# Patient Record
Sex: FEMALE | Race: WHITE | Employment: OTHER | URBAN - METROPOLITAN AREA
[De-identification: names, ages, dates, MRNs, and addresses within clinical notes are randomized per-mention and may not be internally consistent; named-entity substitution may affect disease eponyms.]

---

## 2017-02-14 ENCOUNTER — HOSPITAL ENCOUNTER (OUTPATIENT)
Dept: WOUND CARE | Age: 67
Discharge: HOME | End: 2017-02-14
Attending: PODIATRIST | Admitting: PODIATRIST

## 2017-02-14 VITALS
SYSTOLIC BLOOD PRESSURE: 157 MMHG | TEMPERATURE: 97.7 F | DIASTOLIC BLOOD PRESSURE: 81 MMHG | HEART RATE: 70 BPM | RESPIRATION RATE: 18 BRPM

## 2017-02-14 DIAGNOSIS — I83.009 VENOUS ULCER (HCC): Primary | ICD-10-CM

## 2017-02-14 DIAGNOSIS — L97.909 VENOUS ULCER (HCC): Primary | ICD-10-CM

## 2017-02-14 DIAGNOSIS — I73.9 PVD (PERIPHERAL VASCULAR DISEASE) (HCC): ICD-10-CM

## 2017-02-14 ASSESSMENT — PAIN SCALES - GENERAL: PAINLEVEL_OUTOF10: 5

## 2017-02-14 ASSESSMENT — PAIN DESCRIPTION - DESCRIPTORS: DESCRIPTORS: SHARP

## 2017-02-14 ASSESSMENT — PAIN DESCRIPTION - PAIN TYPE: TYPE: CHRONIC PAIN

## 2017-02-14 ASSESSMENT — PAIN DESCRIPTION - ONSET: ONSET: AWAKENED FROM SLEEP

## 2017-02-14 ASSESSMENT — PAIN DESCRIPTION - LOCATION: LOCATION: LEG

## 2017-02-14 ASSESSMENT — PAIN DESCRIPTION - ORIENTATION: ORIENTATION: RIGHT;LEFT

## 2017-02-14 ASSESSMENT — PAIN DESCRIPTION - FREQUENCY: FREQUENCY: CONTINUOUS

## 2017-02-15 ENCOUNTER — HOSPITAL ENCOUNTER (OUTPATIENT)
Dept: WOUND CARE | Age: 67
Discharge: HOME | End: 2017-02-15
Attending: PODIATRIST | Admitting: PODIATRIST

## 2017-02-15 VITALS — HEART RATE: 81 BPM | SYSTOLIC BLOOD PRESSURE: 180 MMHG | DIASTOLIC BLOOD PRESSURE: 94 MMHG | TEMPERATURE: 97 F

## 2017-02-21 ENCOUNTER — HOSPITAL ENCOUNTER (OUTPATIENT)
Dept: WOUND CARE | Age: 67
Discharge: HOME | End: 2017-02-21
Attending: PODIATRIST | Admitting: PODIATRIST

## 2017-02-21 VITALS — TEMPERATURE: 97 F | SYSTOLIC BLOOD PRESSURE: 186 MMHG | DIASTOLIC BLOOD PRESSURE: 61 MMHG | RESPIRATION RATE: 18 BRPM

## 2017-02-21 DIAGNOSIS — L97.909 VENOUS ULCER (HCC): Primary | ICD-10-CM

## 2017-02-21 DIAGNOSIS — I73.9 PVD (PERIPHERAL VASCULAR DISEASE) (HCC): ICD-10-CM

## 2017-02-21 DIAGNOSIS — I83.009 VENOUS ULCER (HCC): Primary | ICD-10-CM

## 2017-02-24 ENCOUNTER — HOSPITAL ENCOUNTER (OUTPATIENT)
Dept: WOUND CARE | Age: 67
Discharge: HOME | End: 2017-02-24
Attending: PODIATRIST | Admitting: PODIATRIST

## 2017-02-24 VITALS
TEMPERATURE: 97.5 F | DIASTOLIC BLOOD PRESSURE: 80 MMHG | SYSTOLIC BLOOD PRESSURE: 147 MMHG | HEART RATE: 62 BPM | RESPIRATION RATE: 16 BRPM

## 2017-02-28 ENCOUNTER — HOSPITAL ENCOUNTER (OUTPATIENT)
Dept: WOUND CARE | Age: 67
Discharge: HOME | End: 2017-02-28
Attending: PODIATRIST | Admitting: PODIATRIST

## 2017-02-28 VITALS
HEART RATE: 74 BPM | RESPIRATION RATE: 18 BRPM | DIASTOLIC BLOOD PRESSURE: 88 MMHG | TEMPERATURE: 97 F | SYSTOLIC BLOOD PRESSURE: 152 MMHG

## 2017-02-28 DIAGNOSIS — E08.43 DIABETES MELLITUS DUE TO UNDERLYING CONDITION WITH DIABETIC AUTONOMIC NEUROPATHY, WITHOUT LONG-TERM CURRENT USE OF INSULIN (HCC): ICD-10-CM

## 2017-02-28 DIAGNOSIS — I73.9 PVD (PERIPHERAL VASCULAR DISEASE) (HCC): ICD-10-CM

## 2017-02-28 DIAGNOSIS — I83.009 VENOUS ULCER (HCC): Primary | ICD-10-CM

## 2017-02-28 DIAGNOSIS — L97.909 VENOUS ULCER (HCC): Primary | ICD-10-CM

## 2017-06-13 ENCOUNTER — HOSPITAL ENCOUNTER (OUTPATIENT)
Dept: WOUND CARE | Age: 67
Discharge: HOME | End: 2017-06-13
Attending: PODIATRIST | Admitting: PODIATRIST

## 2017-06-13 VITALS
DIASTOLIC BLOOD PRESSURE: 80 MMHG | RESPIRATION RATE: 18 BRPM | WEIGHT: 202 LBS | HEIGHT: 69 IN | TEMPERATURE: 97.2 F | BODY MASS INDEX: 29.92 KG/M2 | SYSTOLIC BLOOD PRESSURE: 132 MMHG | HEART RATE: 74 BPM

## 2017-06-13 DIAGNOSIS — L97.909 VENOUS ULCER (HCC): Primary | ICD-10-CM

## 2017-06-13 DIAGNOSIS — L97.222 NON-PRESSURE CHRONIC ULCER OF LEFT CALF WITH FAT LAYER EXPOSED (HCC): ICD-10-CM

## 2017-06-13 DIAGNOSIS — L97.212 NON-PRESSURE CHRONIC ULCER OF RIGHT CALF WITH FAT LAYER EXPOSED (HCC): ICD-10-CM

## 2017-06-13 DIAGNOSIS — I83.009 VENOUS ULCER (HCC): Primary | ICD-10-CM

## 2017-06-13 DIAGNOSIS — I73.9 PVD (PERIPHERAL VASCULAR DISEASE) (HCC): ICD-10-CM

## 2017-06-13 RX ORDER — LORATADINE 10 MG/1
10 TABLET ORAL DAILY
COMMUNITY

## 2017-06-21 PROBLEM — I95.9 HYPOTENSION: Status: ACTIVE | Noted: 2017-06-21

## 2017-06-21 PROBLEM — I87.2 VENOUS STASIS DERMATITIS OF BOTH LOWER EXTREMITIES: Status: ACTIVE | Noted: 2017-06-21

## 2017-06-21 PROBLEM — E86.0 DEHYDRATION: Status: ACTIVE | Noted: 2017-06-21

## 2017-06-23 ENCOUNTER — HOSPITAL ENCOUNTER (OUTPATIENT)
Dept: WOUND CARE | Age: 67
Discharge: HOME | End: 2017-06-23
Attending: INTERNAL MEDICINE | Admitting: INTERNAL MEDICINE

## 2017-06-23 VITALS
RESPIRATION RATE: 16 BRPM | TEMPERATURE: 95.7 F | DIASTOLIC BLOOD PRESSURE: 67 MMHG | SYSTOLIC BLOOD PRESSURE: 177 MMHG | HEART RATE: 68 BPM

## 2017-06-23 DIAGNOSIS — L97.909 VENOUS ULCER (HCC): ICD-10-CM

## 2017-06-23 DIAGNOSIS — I83.009 VENOUS ULCER (HCC): ICD-10-CM

## 2017-06-23 DIAGNOSIS — I87.2 VENOUS STASIS DERMATITIS OF BOTH LOWER EXTREMITIES: ICD-10-CM

## 2017-06-23 DIAGNOSIS — L97.222 NON-PRESSURE CHRONIC ULCER OF LEFT CALF WITH FAT LAYER EXPOSED (HCC): ICD-10-CM

## 2017-06-23 DIAGNOSIS — L97.212 NON-PRESSURE CHRONIC ULCER OF RIGHT CALF WITH FAT LAYER EXPOSED (HCC): Primary | ICD-10-CM

## 2017-06-23 PROCEDURE — 99213 OFFICE O/P EST LOW 20 MIN: CPT | Performed by: INTERNAL MEDICINE

## 2017-06-27 ENCOUNTER — HOSPITAL ENCOUNTER (OUTPATIENT)
Dept: WOUND CARE | Age: 67
Discharge: HOME | End: 2017-06-27
Attending: INTERNAL MEDICINE | Admitting: INTERNAL MEDICINE

## 2017-06-27 VITALS
SYSTOLIC BLOOD PRESSURE: 159 MMHG | RESPIRATION RATE: 16 BRPM | DIASTOLIC BLOOD PRESSURE: 72 MMHG | TEMPERATURE: 97.7 F | HEART RATE: 50 BPM

## 2017-06-27 DIAGNOSIS — I87.2 VENOUS STASIS DERMATITIS OF BOTH LOWER EXTREMITIES: Primary | ICD-10-CM

## 2017-06-27 DIAGNOSIS — L97.221 NON-PRESSURE CHRONIC ULCER OF LEFT CALF, LIMITED TO BREAKDOWN OF SKIN (HCC): ICD-10-CM

## 2017-06-27 DIAGNOSIS — L97.211 NON-PRESSURE CHRONIC ULCER OF RIGHT CALF, LIMITED TO BREAKDOWN OF SKIN (HCC): ICD-10-CM

## 2017-06-27 PROCEDURE — 99213 OFFICE O/P EST LOW 20 MIN: CPT | Performed by: INTERNAL MEDICINE

## 2017-06-28 PROBLEM — L97.211 NON-PRESSURE CHRONIC ULCER OF RIGHT CALF, LIMITED TO BREAKDOWN OF SKIN (HCC): Status: ACTIVE | Noted: 2017-06-13

## 2017-06-28 PROBLEM — L97.221 NON-PRESSURE CHRONIC ULCER OF LEFT CALF, LIMITED TO BREAKDOWN OF SKIN (HCC): Status: ACTIVE | Noted: 2017-06-13

## 2017-07-11 ENCOUNTER — HOSPITAL ENCOUNTER (OUTPATIENT)
Dept: WOUND CARE | Age: 67
Discharge: HOME | End: 2017-07-11
Attending: INTERNAL MEDICINE | Admitting: INTERNAL MEDICINE

## 2017-07-11 VITALS
SYSTOLIC BLOOD PRESSURE: 123 MMHG | TEMPERATURE: 98.7 F | HEART RATE: 72 BPM | DIASTOLIC BLOOD PRESSURE: 74 MMHG | RESPIRATION RATE: 16 BRPM

## 2017-07-11 DIAGNOSIS — I87.2 VENOUS STASIS DERMATITIS OF BOTH LOWER EXTREMITIES: Primary | ICD-10-CM

## 2017-07-11 DIAGNOSIS — L97.211 NON-PRESSURE CHRONIC ULCER OF RIGHT CALF, LIMITED TO BREAKDOWN OF SKIN (HCC): ICD-10-CM

## 2017-07-11 DIAGNOSIS — L97.221 NON-PRESSURE CHRONIC ULCER OF LEFT CALF, LIMITED TO BREAKDOWN OF SKIN (HCC): ICD-10-CM

## 2017-07-11 PROCEDURE — 99213 OFFICE O/P EST LOW 20 MIN: CPT | Performed by: INTERNAL MEDICINE

## 2017-07-18 ENCOUNTER — HOSPITAL ENCOUNTER (OUTPATIENT)
Dept: WOUND CARE | Age: 67
Discharge: HOME | End: 2017-07-18
Attending: INTERNAL MEDICINE | Admitting: INTERNAL MEDICINE

## 2017-07-18 VITALS
DIASTOLIC BLOOD PRESSURE: 66 MMHG | SYSTOLIC BLOOD PRESSURE: 140 MMHG | RESPIRATION RATE: 16 BRPM | HEART RATE: 81 BPM | TEMPERATURE: 97.5 F

## 2017-07-18 DIAGNOSIS — L97.221 NON-PRESSURE CHRONIC ULCER OF LEFT CALF, LIMITED TO BREAKDOWN OF SKIN (HCC): ICD-10-CM

## 2017-07-18 DIAGNOSIS — L97.211 NON-PRESSURE CHRONIC ULCER OF RIGHT CALF, LIMITED TO BREAKDOWN OF SKIN (HCC): ICD-10-CM

## 2017-07-18 DIAGNOSIS — I87.333 IDIOPATHIC CHRONIC VENOUS HYPERTENSION OF BOTH LOWER EXTREMITIES WITH ULCER AND INFLAMMATION (HCC): ICD-10-CM

## 2017-07-18 DIAGNOSIS — L97.919 IDIOPATHIC CHRONIC VENOUS HYPERTENSION OF BOTH LOWER EXTREMITIES WITH ULCER AND INFLAMMATION (HCC): ICD-10-CM

## 2017-07-18 DIAGNOSIS — L97.929 IDIOPATHIC CHRONIC VENOUS HYPERTENSION OF BOTH LOWER EXTREMITIES WITH ULCER AND INFLAMMATION (HCC): ICD-10-CM

## 2017-07-18 PROCEDURE — 99213 OFFICE O/P EST LOW 20 MIN: CPT | Performed by: INTERNAL MEDICINE

## 2017-07-25 ENCOUNTER — HOSPITAL ENCOUNTER (OUTPATIENT)
Dept: WOUND CARE | Age: 67
Discharge: HOME | End: 2017-07-25
Attending: INTERNAL MEDICINE | Admitting: NURSE PRACTITIONER

## 2017-07-25 VITALS
TEMPERATURE: 96.3 F | SYSTOLIC BLOOD PRESSURE: 144 MMHG | DIASTOLIC BLOOD PRESSURE: 72 MMHG | HEART RATE: 64 BPM | RESPIRATION RATE: 18 BRPM

## 2017-07-25 DIAGNOSIS — I87.333 IDIOPATHIC CHRONIC VENOUS HYPERTENSION OF BOTH LOWER EXTREMITIES WITH ULCER AND INFLAMMATION (HCC): ICD-10-CM

## 2017-07-25 DIAGNOSIS — L97.919 IDIOPATHIC CHRONIC VENOUS HYPERTENSION OF BOTH LOWER EXTREMITIES WITH ULCER AND INFLAMMATION (HCC): ICD-10-CM

## 2017-07-25 DIAGNOSIS — L97.929 IDIOPATHIC CHRONIC VENOUS HYPERTENSION OF BOTH LOWER EXTREMITIES WITH ULCER AND INFLAMMATION (HCC): ICD-10-CM

## 2017-07-25 DIAGNOSIS — L97.221 NON-PRESSURE CHRONIC ULCER OF LEFT CALF, LIMITED TO BREAKDOWN OF SKIN (HCC): ICD-10-CM

## 2017-07-25 DIAGNOSIS — L97.211 NON-PRESSURE CHRONIC ULCER OF RIGHT CALF, LIMITED TO BREAKDOWN OF SKIN (HCC): Primary | ICD-10-CM

## 2017-07-25 PROCEDURE — 99214 OFFICE O/P EST MOD 30 MIN: CPT | Performed by: NURSE PRACTITIONER

## 2017-08-01 ENCOUNTER — HOSPITAL ENCOUNTER (OUTPATIENT)
Dept: WOUND CARE | Age: 67
Discharge: HOME | End: 2017-08-01
Attending: NURSE PRACTITIONER | Admitting: NURSE PRACTITIONER

## 2017-08-01 VITALS
RESPIRATION RATE: 18 BRPM | DIASTOLIC BLOOD PRESSURE: 60 MMHG | HEART RATE: 68 BPM | SYSTOLIC BLOOD PRESSURE: 159 MMHG | TEMPERATURE: 98.3 F

## 2017-08-01 DIAGNOSIS — L97.211 NON-PRESSURE CHRONIC ULCER OF RIGHT CALF, LIMITED TO BREAKDOWN OF SKIN (HCC): ICD-10-CM

## 2017-08-01 DIAGNOSIS — L97.919 IDIOPATHIC CHRONIC VENOUS HYPERTENSION OF BOTH LOWER EXTREMITIES WITH ULCER AND INFLAMMATION (HCC): Primary | ICD-10-CM

## 2017-08-01 DIAGNOSIS — I87.333 IDIOPATHIC CHRONIC VENOUS HYPERTENSION OF BOTH LOWER EXTREMITIES WITH ULCER AND INFLAMMATION (HCC): Primary | ICD-10-CM

## 2017-08-01 DIAGNOSIS — I73.9 PVD (PERIPHERAL VASCULAR DISEASE) (HCC): ICD-10-CM

## 2017-08-01 DIAGNOSIS — L97.929 IDIOPATHIC CHRONIC VENOUS HYPERTENSION OF BOTH LOWER EXTREMITIES WITH ULCER AND INFLAMMATION (HCC): Primary | ICD-10-CM

## 2017-08-01 DIAGNOSIS — L97.221 NON-PRESSURE CHRONIC ULCER OF LEFT CALF, LIMITED TO BREAKDOWN OF SKIN (HCC): ICD-10-CM

## 2017-08-01 PROCEDURE — 99212 OFFICE O/P EST SF 10 MIN: CPT | Performed by: NURSE PRACTITIONER

## 2017-08-04 ENCOUNTER — HOSPITAL ENCOUNTER (OUTPATIENT)
Dept: WOUND CARE | Age: 67
Discharge: HOME | End: 2017-08-04
Attending: PODIATRIST | Admitting: PODIATRIST

## 2017-08-04 VITALS
TEMPERATURE: 96.5 F | RESPIRATION RATE: 16 BRPM | DIASTOLIC BLOOD PRESSURE: 60 MMHG | SYSTOLIC BLOOD PRESSURE: 129 MMHG | HEART RATE: 58 BPM

## 2017-08-08 ENCOUNTER — HOSPITAL ENCOUNTER (OUTPATIENT)
Dept: WOUND CARE | Age: 67
Discharge: HOME | End: 2017-08-08
Attending: NURSE PRACTITIONER | Admitting: NURSE PRACTITIONER

## 2017-08-08 VITALS
DIASTOLIC BLOOD PRESSURE: 80 MMHG | HEART RATE: 58 BPM | SYSTOLIC BLOOD PRESSURE: 140 MMHG | TEMPERATURE: 97.3 F | RESPIRATION RATE: 16 BRPM

## 2017-08-08 DIAGNOSIS — I73.9 PVD (PERIPHERAL VASCULAR DISEASE) (HCC): ICD-10-CM

## 2017-08-08 DIAGNOSIS — I87.333 IDIOPATHIC CHRONIC VENOUS HYPERTENSION OF BOTH LOWER EXTREMITIES WITH ULCER AND INFLAMMATION (HCC): ICD-10-CM

## 2017-08-08 DIAGNOSIS — L97.221 NON-PRESSURE CHRONIC ULCER OF LEFT CALF, LIMITED TO BREAKDOWN OF SKIN (HCC): ICD-10-CM

## 2017-08-08 DIAGNOSIS — L97.211 NON-PRESSURE CHRONIC ULCER OF RIGHT CALF, LIMITED TO BREAKDOWN OF SKIN (HCC): Primary | ICD-10-CM

## 2017-08-08 DIAGNOSIS — L97.929 IDIOPATHIC CHRONIC VENOUS HYPERTENSION OF BOTH LOWER EXTREMITIES WITH ULCER AND INFLAMMATION (HCC): ICD-10-CM

## 2017-08-08 DIAGNOSIS — L97.919 IDIOPATHIC CHRONIC VENOUS HYPERTENSION OF BOTH LOWER EXTREMITIES WITH ULCER AND INFLAMMATION (HCC): ICD-10-CM

## 2017-08-08 PROCEDURE — 97597 DBRDMT OPN WND 1ST 20 CM/<: CPT | Performed by: NURSE PRACTITIONER

## 2017-08-15 ENCOUNTER — HOSPITAL ENCOUNTER (OUTPATIENT)
Dept: WOUND CARE | Age: 67
Discharge: HOME | End: 2017-08-15
Attending: NURSE PRACTITIONER | Admitting: NURSE PRACTITIONER

## 2017-08-15 VITALS
HEART RATE: 76 BPM | SYSTOLIC BLOOD PRESSURE: 133 MMHG | RESPIRATION RATE: 20 BRPM | TEMPERATURE: 96.9 F | DIASTOLIC BLOOD PRESSURE: 65 MMHG

## 2017-08-15 DIAGNOSIS — L97.919 IDIOPATHIC CHRONIC VENOUS HYPERTENSION OF BOTH LOWER EXTREMITIES WITH ULCER AND INFLAMMATION (HCC): ICD-10-CM

## 2017-08-15 DIAGNOSIS — L97.221 NON-PRESSURE CHRONIC ULCER OF LEFT CALF, LIMITED TO BREAKDOWN OF SKIN (HCC): Primary | ICD-10-CM

## 2017-08-15 DIAGNOSIS — I87.333 IDIOPATHIC CHRONIC VENOUS HYPERTENSION OF BOTH LOWER EXTREMITIES WITH ULCER AND INFLAMMATION (HCC): ICD-10-CM

## 2017-08-15 DIAGNOSIS — L97.929 IDIOPATHIC CHRONIC VENOUS HYPERTENSION OF BOTH LOWER EXTREMITIES WITH ULCER AND INFLAMMATION (HCC): ICD-10-CM

## 2017-08-15 DIAGNOSIS — L97.211 NON-PRESSURE CHRONIC ULCER OF RIGHT CALF, LIMITED TO BREAKDOWN OF SKIN (HCC): ICD-10-CM

## 2017-08-15 PROCEDURE — 11042 DBRDMT SUBQ TIS 1ST 20SQCM/<: CPT | Performed by: NURSE PRACTITIONER

## 2017-08-22 ENCOUNTER — HOSPITAL ENCOUNTER (OUTPATIENT)
Dept: WOUND CARE | Age: 67
Discharge: HOME | End: 2017-08-22
Attending: NURSE PRACTITIONER | Admitting: NURSE PRACTITIONER

## 2017-08-22 VITALS
TEMPERATURE: 96.7 F | HEART RATE: 73 BPM | RESPIRATION RATE: 18 BRPM | SYSTOLIC BLOOD PRESSURE: 145 MMHG | DIASTOLIC BLOOD PRESSURE: 72 MMHG

## 2017-08-22 DIAGNOSIS — I87.333 IDIOPATHIC CHRONIC VENOUS HYPERTENSION OF BOTH LOWER EXTREMITIES WITH ULCER AND INFLAMMATION (HCC): ICD-10-CM

## 2017-08-22 DIAGNOSIS — L97.919 IDIOPATHIC CHRONIC VENOUS HYPERTENSION OF BOTH LOWER EXTREMITIES WITH ULCER AND INFLAMMATION (HCC): ICD-10-CM

## 2017-08-22 DIAGNOSIS — L97.221 NON-PRESSURE CHRONIC ULCER OF LEFT CALF, LIMITED TO BREAKDOWN OF SKIN (HCC): ICD-10-CM

## 2017-08-22 DIAGNOSIS — L97.929 IDIOPATHIC CHRONIC VENOUS HYPERTENSION OF BOTH LOWER EXTREMITIES WITH ULCER AND INFLAMMATION (HCC): ICD-10-CM

## 2017-08-22 DIAGNOSIS — I73.9 PVD (PERIPHERAL VASCULAR DISEASE) (HCC): Primary | ICD-10-CM

## 2017-08-22 DIAGNOSIS — L97.211 NON-PRESSURE CHRONIC ULCER OF RIGHT CALF, LIMITED TO BREAKDOWN OF SKIN (HCC): ICD-10-CM

## 2017-08-22 PROCEDURE — 99212 OFFICE O/P EST SF 10 MIN: CPT | Performed by: NURSE PRACTITIONER

## 2017-08-29 ENCOUNTER — HOSPITAL ENCOUNTER (OUTPATIENT)
Dept: WOUND CARE | Age: 67
Discharge: HOME | End: 2017-08-29
Attending: NURSE PRACTITIONER | Admitting: NURSE PRACTITIONER

## 2017-08-29 VITALS
SYSTOLIC BLOOD PRESSURE: 149 MMHG | HEART RATE: 76 BPM | RESPIRATION RATE: 18 BRPM | TEMPERATURE: 96.9 F | DIASTOLIC BLOOD PRESSURE: 86 MMHG

## 2017-08-29 DIAGNOSIS — I87.333 IDIOPATHIC CHRONIC VENOUS HYPERTENSION OF BOTH LOWER EXTREMITIES WITH ULCER AND INFLAMMATION (HCC): Primary | ICD-10-CM

## 2017-08-29 DIAGNOSIS — L97.929 IDIOPATHIC CHRONIC VENOUS HYPERTENSION OF BOTH LOWER EXTREMITIES WITH ULCER AND INFLAMMATION (HCC): Primary | ICD-10-CM

## 2017-08-29 DIAGNOSIS — L97.919 IDIOPATHIC CHRONIC VENOUS HYPERTENSION OF BOTH LOWER EXTREMITIES WITH ULCER AND INFLAMMATION (HCC): Primary | ICD-10-CM

## 2017-08-29 PROCEDURE — 99212 OFFICE O/P EST SF 10 MIN: CPT | Performed by: NURSE PRACTITIONER

## 2017-09-05 ENCOUNTER — HOSPITAL ENCOUNTER (OUTPATIENT)
Dept: WOUND CARE | Age: 67
Discharge: HOME | End: 2017-09-05
Attending: PODIATRIST | Admitting: PODIATRIST

## 2017-09-05 VITALS
HEART RATE: 71 BPM | RESPIRATION RATE: 16 BRPM | SYSTOLIC BLOOD PRESSURE: 104 MMHG | DIASTOLIC BLOOD PRESSURE: 51 MMHG | TEMPERATURE: 96.1 F

## 2017-09-05 DIAGNOSIS — I83.009 VENOUS ULCER (HCC): ICD-10-CM

## 2017-09-05 DIAGNOSIS — I87.333 IDIOPATHIC CHRONIC VENOUS HYPERTENSION OF BOTH LOWER EXTREMITIES WITH ULCER AND INFLAMMATION (HCC): Primary | ICD-10-CM

## 2017-09-05 DIAGNOSIS — L97.919 IDIOPATHIC CHRONIC VENOUS HYPERTENSION OF BOTH LOWER EXTREMITIES WITH ULCER AND INFLAMMATION (HCC): Primary | ICD-10-CM

## 2017-09-05 DIAGNOSIS — L97.221 NON-PRESSURE CHRONIC ULCER OF LEFT CALF, LIMITED TO BREAKDOWN OF SKIN (HCC): ICD-10-CM

## 2017-09-05 DIAGNOSIS — L97.909 VENOUS ULCER (HCC): ICD-10-CM

## 2017-09-05 DIAGNOSIS — L97.211 NON-PRESSURE CHRONIC ULCER OF RIGHT CALF, LIMITED TO BREAKDOWN OF SKIN (HCC): ICD-10-CM

## 2017-09-05 DIAGNOSIS — L97.929 IDIOPATHIC CHRONIC VENOUS HYPERTENSION OF BOTH LOWER EXTREMITIES WITH ULCER AND INFLAMMATION (HCC): Primary | ICD-10-CM

## 2017-09-12 ENCOUNTER — HOSPITAL ENCOUNTER (OUTPATIENT)
Dept: WOUND CARE | Age: 67
Discharge: HOME | End: 2017-09-12
Attending: PODIATRIST | Admitting: PODIATRIST

## 2017-09-12 VITALS
TEMPERATURE: 95.9 F | HEART RATE: 61 BPM | SYSTOLIC BLOOD PRESSURE: 126 MMHG | RESPIRATION RATE: 16 BRPM | DIASTOLIC BLOOD PRESSURE: 68 MMHG

## 2017-09-12 DIAGNOSIS — L97.929 IDIOPATHIC CHRONIC VENOUS HYPERTENSION OF BOTH LOWER EXTREMITIES WITH ULCER AND INFLAMMATION (HCC): ICD-10-CM

## 2017-09-12 DIAGNOSIS — L97.222 NON-PRESSURE CHRONIC ULCER OF LEFT CALF WITH FAT LAYER EXPOSED (HCC): ICD-10-CM

## 2017-09-12 DIAGNOSIS — I87.333 IDIOPATHIC CHRONIC VENOUS HYPERTENSION OF BOTH LOWER EXTREMITIES WITH ULCER AND INFLAMMATION (HCC): ICD-10-CM

## 2017-09-12 DIAGNOSIS — I73.9 PVD (PERIPHERAL VASCULAR DISEASE) (HCC): ICD-10-CM

## 2017-09-12 DIAGNOSIS — L97.221 NON-PRESSURE CHRONIC ULCER OF LEFT CALF, LIMITED TO BREAKDOWN OF SKIN (HCC): Primary | ICD-10-CM

## 2017-09-12 DIAGNOSIS — L97.909 VENOUS ULCER (HCC): ICD-10-CM

## 2017-09-12 DIAGNOSIS — L97.919 IDIOPATHIC CHRONIC VENOUS HYPERTENSION OF BOTH LOWER EXTREMITIES WITH ULCER AND INFLAMMATION (HCC): ICD-10-CM

## 2017-09-12 DIAGNOSIS — I83.009 VENOUS ULCER (HCC): ICD-10-CM

## 2017-09-19 ENCOUNTER — HOSPITAL ENCOUNTER (OUTPATIENT)
Dept: WOUND CARE | Age: 67
Discharge: HOME | End: 2017-09-19
Attending: PODIATRIST | Admitting: PODIATRIST

## 2017-09-19 VITALS
RESPIRATION RATE: 16 BRPM | TEMPERATURE: 98.4 F | SYSTOLIC BLOOD PRESSURE: 142 MMHG | HEART RATE: 65 BPM | DIASTOLIC BLOOD PRESSURE: 74 MMHG

## 2017-09-19 DIAGNOSIS — I73.9 PVD (PERIPHERAL VASCULAR DISEASE) (HCC): ICD-10-CM

## 2017-09-19 DIAGNOSIS — I87.333 IDIOPATHIC CHRONIC VENOUS HYPERTENSION OF BOTH LOWER EXTREMITIES WITH ULCER AND INFLAMMATION (HCC): ICD-10-CM

## 2017-09-19 DIAGNOSIS — I83.009 VENOUS ULCER (HCC): ICD-10-CM

## 2017-09-19 DIAGNOSIS — L97.929 IDIOPATHIC CHRONIC VENOUS HYPERTENSION OF BOTH LOWER EXTREMITIES WITH ULCER AND INFLAMMATION (HCC): ICD-10-CM

## 2017-09-19 DIAGNOSIS — L97.909 VENOUS ULCER (HCC): ICD-10-CM

## 2017-09-19 DIAGNOSIS — L97.222 NON-PRESSURE CHRONIC ULCER OF LEFT CALF WITH FAT LAYER EXPOSED (HCC): Primary | ICD-10-CM

## 2017-09-19 DIAGNOSIS — L97.919 IDIOPATHIC CHRONIC VENOUS HYPERTENSION OF BOTH LOWER EXTREMITIES WITH ULCER AND INFLAMMATION (HCC): ICD-10-CM

## 2017-09-26 ENCOUNTER — HOSPITAL ENCOUNTER (OUTPATIENT)
Dept: WOUND CARE | Age: 67
Discharge: HOME | End: 2017-09-26
Attending: PODIATRIST | Admitting: PODIATRIST

## 2017-09-26 DIAGNOSIS — L97.929 IDIOPATHIC CHRONIC VENOUS HYPERTENSION OF BOTH LOWER EXTREMITIES WITH ULCER AND INFLAMMATION (HCC): Primary | ICD-10-CM

## 2017-09-26 DIAGNOSIS — L97.919 IDIOPATHIC CHRONIC VENOUS HYPERTENSION OF BOTH LOWER EXTREMITIES WITH ULCER AND INFLAMMATION (HCC): Primary | ICD-10-CM

## 2017-09-26 DIAGNOSIS — L97.221 NON-PRESSURE CHRONIC ULCER OF LEFT CALF, LIMITED TO BREAKDOWN OF SKIN (HCC): ICD-10-CM

## 2017-09-26 DIAGNOSIS — L97.222 NON-PRESSURE CHRONIC ULCER OF LEFT CALF WITH FAT LAYER EXPOSED (HCC): ICD-10-CM

## 2017-09-26 DIAGNOSIS — I87.333 IDIOPATHIC CHRONIC VENOUS HYPERTENSION OF BOTH LOWER EXTREMITIES WITH ULCER AND INFLAMMATION (HCC): Primary | ICD-10-CM

## 2017-10-03 ENCOUNTER — HOSPITAL ENCOUNTER (OUTPATIENT)
Dept: WOUND CARE | Age: 67
Discharge: HOME | End: 2017-10-03
Attending: PODIATRIST | Admitting: PODIATRIST

## 2017-10-03 VITALS
TEMPERATURE: 97 F | HEART RATE: 64 BPM | SYSTOLIC BLOOD PRESSURE: 149 MMHG | DIASTOLIC BLOOD PRESSURE: 64 MMHG | RESPIRATION RATE: 16 BRPM

## 2017-10-03 DIAGNOSIS — L97.909 VENOUS ULCER (HCC): ICD-10-CM

## 2017-10-03 DIAGNOSIS — L97.222 NON-PRESSURE CHRONIC ULCER OF LEFT CALF WITH FAT LAYER EXPOSED (HCC): Primary | ICD-10-CM

## 2017-10-03 DIAGNOSIS — I83.009 VENOUS ULCER (HCC): ICD-10-CM

## 2017-10-03 DIAGNOSIS — L97.929 IDIOPATHIC CHRONIC VENOUS HYPERTENSION OF BOTH LOWER EXTREMITIES WITH ULCER AND INFLAMMATION (HCC): ICD-10-CM

## 2017-10-03 DIAGNOSIS — I73.9 PVD (PERIPHERAL VASCULAR DISEASE) (HCC): ICD-10-CM

## 2017-10-03 DIAGNOSIS — L97.919 IDIOPATHIC CHRONIC VENOUS HYPERTENSION OF BOTH LOWER EXTREMITIES WITH ULCER AND INFLAMMATION (HCC): ICD-10-CM

## 2017-10-03 DIAGNOSIS — I87.333 IDIOPATHIC CHRONIC VENOUS HYPERTENSION OF BOTH LOWER EXTREMITIES WITH ULCER AND INFLAMMATION (HCC): ICD-10-CM

## 2017-10-03 DIAGNOSIS — L97.221 NON-PRESSURE CHRONIC ULCER OF LEFT CALF, LIMITED TO BREAKDOWN OF SKIN (HCC): ICD-10-CM

## 2017-10-03 NOTE — PROGRESS NOTES
Wound Care Center Progress Note       Audley Jeans Chatwood  AGE: 78 y.o. GENDER: female  : 1950  TODAY'S DATE:  10/3/2017        Subjective:     Chief Complaint   Patient presents with    Wound Check     mariela legs         HISTORY of PRESENT ILLNESS     Isidoro Rico is a 78 y.o. female who presents today for wound evaluation of Chronic venous wound(s) of L lower leg anterior. The wound is of moderate severity. The underlying cause of the wound is VENOUS DISEASE. Healed now.   Wound Pain Timing/Severity: none  Quality of pain: N/A  Severity of pain:  0 / 10   Modifying Factors: edema, venous stasis, lymphedema, diabetes and chronic pressure  Associated Signs/Symptoms: edema and numbness        PAST MEDICAL HISTORY        Diagnosis Date    Cancer [facility])     endometrial    Diabetes mellitus ([address]     Diabetes mellitus with peripheral autonomic neuropathy ([address]     H/O Doppler ultrasound 2016    peripheral - bilataeral SFA stenosis    Hyperlipidemia     Hypertension     Other disorders of kidney and ureter in diseases classified elsewhere     Pneumonia     Thyroid disease     Type II or unspecified type diabetes mellitus without mention of complication, not stated as uncontrolled     Ulcer of lower limb, unspecified     Unspecified venous (peripheral) insufficiency     Venous ulcer 2015    WD-Idiopathic chronic venous hypertension of both lower extremities with ulcer and inflammation ([address]        PAST SURGICAL HISTORY    Past Surgical History:   Procedure Laterality Date    BRAIN SURGERY      COLONOSCOPY      EYE SURGERY      HYSTERECTOMY      PITUITARY EXCISION  2008    SKIN BIOPSY      TONSILLECTOMY         FAMILY HISTORY    Family History   Problem Relation Age of Onset    Diabetes Mother     Other Father      alzheimers    Diabetes Father     Cancer Father      colon       SOCIAL HISTORY    Social History Substance Use Topics    Smoking status: Never Smoker    Smokeless tobacco: Never Used    Alcohol use No       ALLERGIES    Allergies   Allergen Reactions    Dye [Iodides]        MEDICATIONS    Current Outpatient Prescriptions on File Prior to Encounter   Medication Sig Dispense Refill    levothyroxine (SYNTHROID) 112 MCG tablet Take 112 mcg by mouth Daily      loratadine (CLARITIN) 10 MG tablet Take 10 mg by mouth daily      Liraglutide (VICTOZA) 18 MG/3ML SOPN SC injection Inject 1.8 mg into the skin daily       potassium chloride (K-DUR) 10 MEQ tablet 20 mEq       aspirin 81 MG tablet Take 81 mg by mouth daily.  hydrocortisone (CORTEF) 10 MG tablet Take 15 mg by mouth 2 times daily 15 In the morning. 10 at night      metoprolol (LOPRESSOR) 25 MG tablet Take 25 mg by mouth 2 times daily.  metFORMIN (GLUCOPHAGE) 1000 MG tablet Take 1,000 mg by mouth 2 times daily.  insulin glargine (LANTUS) 100 UNIT/ML injection vial Inject 20 Units into the skin every morning (before breakfast)       atorvastatin (LIPITOR) 20 MG tablet Take 20 mg by mouth daily. No current facility-administered medications on file prior to encounter. REVIEW OF SYSTEMS      Constitutional: Negative for systemic symptoms including fever, chills and malaise. Objective:      BP (!) 149/64  Pulse 64  Temp 97 °F (36.1 °C) (Temporal)   Resp 16    PHYSICAL EXAM      General: The patient is in no acute distress. Mental status:  Patient is appropriate, is  oriented to place and plan of care. Dermatologic exam: Visual inspection of the periwound reveals the skin to be edematous.   Wound exam:  see wound description below     All active wounds listed below with today's date are evaluated           Assessment:       Problem List Items Addressed This Visit     WC-Venous ulcer ([address]    WD-Non-pressure chronic ulcer of left calf, limited to breakdown of skin ([address]    Non-pressure chronic ulcer of left calf with fat FOR POSSIBLE ANGIOPLASTY OF LEFT LEG.         Orders for this week: 10/3/2017:       PATIENT TO FOLLOW-UP WITH DR RAGSDALE FOR ANGIOPLASTY AS NEEDED. FOLLOW-UP APPOINTMENT SCHEDULED ON AUGUST 16 TH 2017 AT 9:30 AM--PATIENT MISSED APPOINTMENT AND RESCHEDULED FOR 10/4/17 @ 1PM      LOTION TO INTACT SKIN ON LOWER LEGS DAILY           LEFT LOWER LEG CLUSTER / RIGHT ANTERIOR LOWER LEG- HEALED. PLEASE TAKE HEALED PHOTOS. [address] LEGS WITH SOAP AND WATER AND PAT DRY DAILY.  APPLY LOTION TO INTACT SKIN TO  BILATERAL LOWER LEGS DAILY. APPLY 15-20 COMPRESSION STOCKINGS FOLLOWED BY JUXTA-LITE TO BILATERAL LOWER LEGS.  APPLY IN AM AND REMOVE IN PM.         DISCHARGE TODAY FROM THE [facility]      Call [phone] for any questions or concerns.      Date__________ Time____________  Azeem Landaverde  Physician Signature__________________________  Azeem Landaverde                                                                               Treatment Note [REMOVED] Wound 06/13/17 LEFT ANTERIOR LOWER LEG CLUSTER ( WOUND # 21  ONSET DATE 3 MONTHS)  VENOUS/ARTERIAL-Dressing/Treatment:  (LOTION, COMP SOCK, JUXTA LITE)  [REMOVED] Wound 09/26/17 # 22 Right anterior lower leg (onset one week ago)  VENOUS / ARTERIAL-Dressing/Treatment:  (LOTION, COMP SOCK, JUXTA LITE)    Written Patient Dismissal Instructions Given            Electronically signed by Kerline Thacker DPM on 10/3/2017 at 3:12 PM

## 2017-10-03 NOTE — IP AVS SNAPSHOT
After Visit Summary  (Discharge Instructions)    Medication List for Home    Based on the information you provided to us as well as any changes during this visit, the following is your updated medication list.  Compare this with your prescription bottles at home. If you have any questions or concerns, contact your primary care physician's office. Daily Medication List (This medication list can be shared with any healthcare provider who is helping you manage your medications)      ASK your doctor about these medications if you have questions        Last Dose    Next Dose Due AM NOON PM NIGHT    aspirin 81 MG tablet   Take 81 mg by mouth daily. atorvastatin 20 MG tablet   Commonly known as:  LIPITOR   Take 20 mg by mouth daily. CLARITIN 10 MG tablet   Generic drug:  loratadine   Take 10 mg by mouth daily                                         hydrocortisone 10 MG tablet   Commonly known as:  CORTEF   Take 15 mg by mouth 2 times daily 15 In the morning. 10 at night                                         insulin glargine 100 UNIT/ML injection vial   Commonly known as:  LANTUS   Inject 20 Units into the skin every morning (before breakfast)                                         levothyroxine 112 MCG tablet   Commonly known as:  SYNTHROID   Take 112 mcg by mouth Daily                                         metFORMIN 1000 MG tablet   Commonly known as:  GLUCOPHAGE   Take 1,000 mg by mouth 2 times daily. metoprolol tartrate 25 MG tablet   Commonly known as:  LOPRESSOR   Take 25 mg by mouth 2 times daily.                                          potassium chloride 10 MEQ extended release tablet   Commonly known as:  KLOR-CON   20 mEq                                         VICTOZA 18 MG/3ML Sopn SC injection   Generic drug:  Liraglutide   Inject 1.8 mg into the skin daily stop taking until medicine is all gone.       PATIENT TO FOLLOW-UP WITH DR RAGSDALE ON 2/22/2017 FOR POSSIBLE ANGIOPLASTY OF LEFT LEG.         Orders for this week: 10/3/2017:       PATIENT TO FOLLOW-UP WITH DR RAGSDALE FOR ANGIOPLASTY AS NEEDED. FOLLOW-UP APPOINTMENT SCHEDULED ON AUGUST 16 TH 2017 AT 9:30 AM--PATIENT MISSED APPOINTMENT AND RESCHEDULED FOR 10/4/17 @ 1PM      LOTION TO INTACT SKIN ON LOWER LEGS DAILY           LEFT LOWER LEG CLUSTER / RIGHT ANTERIOR LOWER LEG- HEALED. PLEASE TAKE HEALED PHOTOS. [address] LEGS WITH SOAP AND WATER AND PAT DRY DAILY.  APPLY LOTION TO INTACT SKIN TO  BILATERAL LOWER LEGS DAILY. APPLY 15-20 COMPRESSION STOCKINGS FOLLOWED BY JUXTA-LITE TO BILATERAL LOWER LEGS. APPLY IN AM AND REMOVE IN PM.         DISCHARGE TODAY FROM THE [facility]      Call [phone] for any questions or concerns.      Date__________ Time____________  Vasiliy Musc  Physician Signature__________________________  [facility] Musc                                                                             MyChart Signup     Our records indicate that you have declined MyChart signup. View your information online  ? Review your current list of  medications, immunization, and allergies. ? Review your future test results online . ? Review your discharge instructions provided by your caregivers at discharge    Certain functionality such as prescription refills, scheduling appointments or sending messages to your provider are not activated if your provider does not use [employer] in his/her office    For questions regarding your MyChart account call [phone]. If you have a clinical question, please call your doctor's office. The information on all pages of the After Visit Summary has been reviewed with me, the patient and/or responsible adult, by my health care provider(s). I had the opportunity to ask questions regarding this information.  I understand I should dispose of my armband safely at home to

## 2017-10-03 NOTE — PLAN OF CARE
Problem: Compression therapy:  Goal: Will be free from complications associated with compression therapy  Will be free from complications associated with compression therapy   Outcome: Completed Date Met:  10/03/17

## 2017-10-03 NOTE — PROGRESS NOTES
APPLIED LOTION, COMPRESSION SOCKS, JUXTA LITE TO BILATERAL LEGS. Electronically signed by Rajat Winters RN on 10/3/2017 at 2:32 PM

## 2017-10-12 ENCOUNTER — HOSPITAL ENCOUNTER (OUTPATIENT)
Dept: CARDIAC CATH/INVASIVE PROCEDURES | Age: 67
Discharge: HOME | End: 2017-11-10
Attending: SURGERY | Admitting: SURGERY

## 2018-03-01 NOTE — ANESTHESIA PRE-OP
not stated as uncontrolled     Ulcer of lower limb, unspecified     Unspecified venous (peripheral) insufficiency     Venous ulcer 11/4/2015    WD-Idiopathic chronic venous hypertension of both lower extremities with ulcer and inflammation (HCC)        Past Surgical History:        Procedure Laterality Date    BRAIN SURGERY  2007    COLONOSCOPY      EYE SURGERY      HYSTERECTOMY      PITUITARY EXCISION  2008    SKIN BIOPSY      TONSILLECTOMY         Social History:    Social History   Substance Use Topics    Smoking status: Never Smoker    Smokeless tobacco: Never Used    Alcohol use No                                Counseling given: Not Answered      Vital Signs (Current): There were no vitals filed for this visit. BP Readings from Last 3 Encounters:   10/04/17 138/78   10/03/17 (!) 149/64   09/20/17 (!) 140/74       NPO Status:                                                                                 BMI:   Wt Readings from Last 3 Encounters:   02/28/18 200 lb (90.7 kg)   10/04/17 202 lb (91.6 kg)   09/20/17 198 lb (89.8 kg)     There is no height or weight on file to calculate BMI. Anesthesia Evaluation    Airway:         Dental:          Pulmonary:                              Cardiovascular:    (+) hypertension:, hyperlipidemia         Beta Blocker:  Order written         Neuro/Psych:               GI/Hepatic/Renal:   (+) bowel prep, morbid obesity          Endo/Other:    (+) Diabetes, hypothyroidism::., .                 Abdominal:           Vascular:   + PVD, aortic or cerebral, . Anesthesia Plan      general and TIVA     ASA 3       Induction: intravenous. Bernardino Chavira CRNA   3/1/2018     Pre Anesthesia Assessment complete.  Chart reviewed on 3/1/2018

## 2018-03-02 ENCOUNTER — HOSPITAL ENCOUNTER (OUTPATIENT)
Dept: SURGERY | Age: 68
Discharge: HOME | End: 2018-03-02
Attending: SPECIALIST | Admitting: SPECIALIST

## 2018-03-02 VITALS
SYSTOLIC BLOOD PRESSURE: 140 MMHG | WEIGHT: 203 LBS | HEIGHT: 69 IN | HEART RATE: 70 BPM | OXYGEN SATURATION: 98 % | TEMPERATURE: 97.2 F | BODY MASS INDEX: 30.07 KG/M2 | DIASTOLIC BLOOD PRESSURE: 61 MMHG | RESPIRATION RATE: 16 BRPM

## 2018-03-02 DIAGNOSIS — Z80.0 FAMILY HISTORY OF LYNCH SYNDROME: ICD-10-CM

## 2018-03-02 LAB — GLUCOSE BLD-MCNC: 123 MG/DL (ref 70–99)

## 2018-03-02 RX ORDER — SODIUM CHLORIDE 9 MG/ML
INJECTION, SOLUTION INTRAVENOUS CONTINUOUS
Status: DISCONTINUED | OUTPATIENT
Start: 2018-03-02 | End: 2018-03-03 | Stop reason: HOSPADM

## 2018-03-02 RX ORDER — SODIUM CHLORIDE, SODIUM LACTATE, POTASSIUM CHLORIDE, CALCIUM CHLORIDE 600; 310; 30; 20 MG/100ML; MG/100ML; MG/100ML; MG/100ML
INJECTION, SOLUTION INTRAVENOUS CONTINUOUS
Status: CANCELLED | OUTPATIENT
Start: 2018-03-02

## 2018-03-02 RX ADMIN — SODIUM CHLORIDE: 9 INJECTION, SOLUTION INTRAVENOUS at 08:53

## 2018-03-02 ASSESSMENT — PAIN SCALES - GENERAL
PAINLEVEL_OUTOF10: 0
PAINLEVEL_OUTOF10: 0

## 2018-03-02 ASSESSMENT — PAIN - FUNCTIONAL ASSESSMENT: PAIN_FUNCTIONAL_ASSESSMENT: 0-10

## 2018-03-02 NOTE — ANESTHESIA PRE-OP
Historical Provider, MD   verapamil (VERELAN) 240 MG extended release capsule Take 240 mg by mouth nightly   Yes Historical Provider, MD   levothyroxine (SYNTHROID) 112 MCG tablet Take 112 mcg by mouth Daily   Yes Historical Provider, MD   Liraglutide (VICTOZA) 18 MG/3ML SOPN SC injection Inject 1.8 mg into the skin daily    Yes Historical Provider, MD   potassium chloride (K-DUR) 10 MEQ tablet 20 mEq  10/26/15  Yes Historical Provider, MD   aspirin 81 MG tablet Take 81 mg by mouth daily. Yes Historical Provider, MD   hydrocortisone (CORTEF) 10 MG tablet Take 15 mg by mouth 2 times daily 15 In the morning. 10 at night   Yes Historical Provider, MD   metoprolol (LOPRESSOR) 25 MG tablet Take 25 mg by mouth 2 times daily. Yes Historical Provider, MD   metFORMIN (GLUCOPHAGE) 1000 MG tablet Take 1,000 mg by mouth 2 times daily. Yes Historical Provider, MD   insulin glargine (LANTUS) 100 UNIT/ML injection vial Inject 20 Units into the skin every morning (before breakfast)    Yes Historical Provider, MD   atorvastatin (LIPITOR) 20 MG tablet Take 20 mg by mouth daily.    Yes Historical Provider, MD   loratadine (CLARITIN) 10 MG tablet Take 10 mg by mouth daily    Historical Provider, MD       Current medications:    Current Outpatient Prescriptions   Medication Sig Dispense Refill    clopidogrel (PLAVIX) 75 MG tablet Take 75 mg by mouth daily      empagliflozin (JARDIANCE) 25 MG tablet Take 25 mg by mouth daily      hydrochlorothiazide (HYDRODIURIL) 12.5 MG tablet Take 12.5 mg by mouth daily      furosemide (LASIX) 40 MG tablet Take 40 mg by mouth 2 times a week      verapamil (VERELAN) 240 MG extended release capsule Take 240 mg by mouth nightly      levothyroxine (SYNTHROID) 112 MCG tablet Take 112 mcg by mouth Daily      Liraglutide (VICTOZA) 18 MG/3ML SOPN SC injection Inject 1.8 mg into the skin daily       potassium chloride (K-DUR) 10 MEQ tablet 20 mEq       aspirin 81 MG tablet Take 81 mg by mouth Other disorders of kidney and ureter in diseases classified elsewhere     Pneumonia     Thyroid disease     Type II or unspecified type diabetes mellitus without mention of complication, not stated as uncontrolled     Ulcer of lower limb, unspecified     Unspecified venous (peripheral) insufficiency     Venous ulcer 11/4/2015    WD-Idiopathic chronic venous hypertension of both lower extremities with ulcer and inflammation (HCC)        Past Surgical History:        Procedure Laterality Date    BRAIN SURGERY  2007    COLONOSCOPY      EYE SURGERY      HYSTERECTOMY      PITUITARY EXCISION  2008    SKIN BIOPSY      TONSILLECTOMY         Social History:    Social History   Substance Use Topics    Smoking status: Never Smoker    Smokeless tobacco: Never Used    Alcohol use No                                Counseling given: Not Answered      Vital Signs (Current):   Vitals:    03/02/18 0825   Pulse: 89   Resp: 16   Temp: 97.1 °F (36.2 °C)   TempSrc: Temporal   SpO2: 98%   Weight: 203 lb (92.1 kg)   Height: 5' 9\" (1.753 m)                                              BP Readings from Last 3 Encounters:   10/04/17 138/78   10/03/17 (!) 149/64   09/20/17 (!) 140/74       NPO Status:                                                                                 BMI:   Wt Readings from Last 3 Encounters:   03/02/18 203 lb (92.1 kg)   02/28/18 200 lb (90.7 kg)   10/04/17 202 lb (91.6 kg)     Body mass index is 29.98 kg/m².     Anesthesia Evaluation   no history of anesthetic complications:   Airway: Mallampati: II  TM distance: >3 FB   Neck ROM: full  Mouth opening: > = 3 FB Dental: normal exam         Pulmonary:Negative Pulmonary ROS                              Cardiovascular:  Exercise tolerance: good (>4 METS),   (+) hypertension:, hyperlipidemia         Beta Blocker:  Dose within 24 Hrs         Neuro/Psych:   (+) neuromuscular disease (diabetic neuropathy):,             GI/Hepatic/Renal:   (+) bowel prep,

## 2018-03-02 NOTE — BRIEF OP NOTE
BRIEF COLONOSCOPY REPORT:    Impression:    1) small internal hemorrhoids    2) otherwise normal        Suggest:   1) repeat in 2-3 years       The complete operative report (including photos) is available in the following locations:   1) soft chart now   2) report will also be scanned and can then be found by going to \"chart review\" then \"notes\" then \"op report\" or by going to \"chart review\" then \"media\" then \"op report\". For review of photos, may need to go to page 2.

## 2018-04-25 PROBLEM — I87.2 VENOUS INSUFFICIENCY OF BOTH LOWER EXTREMITIES: Status: ACTIVE | Noted: 2018-04-25

## 2018-09-26 PROBLEM — E86.0 DEHYDRATION: Status: RESOLVED | Noted: 2017-06-21 | Resolved: 2018-09-26
